# Patient Record
Sex: MALE | Race: OTHER | URBAN - METROPOLITAN AREA
[De-identification: names, ages, dates, MRNs, and addresses within clinical notes are randomized per-mention and may not be internally consistent; named-entity substitution may affect disease eponyms.]

---

## 2020-12-29 ENCOUNTER — OFFICE VISIT (OUTPATIENT)
Dept: FAMILY MEDICINE CLINIC | Facility: CLINIC | Age: 40
End: 2020-12-29
Payer: MEDICAID

## 2020-12-29 VITALS
WEIGHT: 151.63 LBS | HEART RATE: 73 BPM | RESPIRATION RATE: 18 BRPM | OXYGEN SATURATION: 99 % | HEIGHT: 71 IN | BODY MASS INDEX: 21.23 KG/M2 | TEMPERATURE: 98 F | DIASTOLIC BLOOD PRESSURE: 64 MMHG | SYSTOLIC BLOOD PRESSURE: 112 MMHG

## 2020-12-29 DIAGNOSIS — J30.9 ALLERGIC RHINITIS, UNSPECIFIED SEASONALITY, UNSPECIFIED TRIGGER: ICD-10-CM

## 2020-12-29 DIAGNOSIS — Z13.29 SCREENING FOR ENDOCRINE, NUTRITIONAL, METABOLIC AND IMMUNITY DISORDER: ICD-10-CM

## 2020-12-29 DIAGNOSIS — D86.3 SARCOIDOSIS OF SKIN: ICD-10-CM

## 2020-12-29 DIAGNOSIS — Z13.0 SCREENING FOR ENDOCRINE, NUTRITIONAL, METABOLIC AND IMMUNITY DISORDER: ICD-10-CM

## 2020-12-29 DIAGNOSIS — Z13.228 SCREENING FOR ENDOCRINE, NUTRITIONAL, METABOLIC AND IMMUNITY DISORDER: ICD-10-CM

## 2020-12-29 DIAGNOSIS — Z13.0 SCREENING FOR IRON DEFICIENCY ANEMIA: ICD-10-CM

## 2020-12-29 DIAGNOSIS — Z13.21 SCREENING FOR ENDOCRINE, NUTRITIONAL, METABOLIC AND IMMUNITY DISORDER: ICD-10-CM

## 2020-12-29 DIAGNOSIS — Z13.6 SCREENING FOR HEART DISEASE: ICD-10-CM

## 2020-12-29 DIAGNOSIS — Z00.00 ANNUAL PHYSICAL EXAM: Primary | ICD-10-CM

## 2020-12-29 PROBLEM — J31.0 CHRONIC RHINITIS: Status: ACTIVE | Noted: 2020-12-29

## 2020-12-29 PROCEDURE — 85025 COMPLETE CBC W/AUTO DIFF WBC: CPT | Performed by: FAMILY MEDICINE

## 2020-12-29 PROCEDURE — 80053 COMPREHEN METABOLIC PANEL: CPT | Performed by: FAMILY MEDICINE

## 2020-12-29 PROCEDURE — 36415 COLL VENOUS BLD VENIPUNCTURE: CPT | Performed by: FAMILY MEDICINE

## 2020-12-29 PROCEDURE — 99386 PREV VISIT NEW AGE 40-64: CPT | Performed by: FAMILY MEDICINE

## 2020-12-29 PROCEDURE — 3008F BODY MASS INDEX DOCD: CPT | Performed by: FAMILY MEDICINE

## 2020-12-29 PROCEDURE — 3078F DIAST BP <80 MM HG: CPT | Performed by: FAMILY MEDICINE

## 2020-12-29 PROCEDURE — 80061 LIPID PANEL: CPT | Performed by: FAMILY MEDICINE

## 2020-12-29 PROCEDURE — 3074F SYST BP LT 130 MM HG: CPT | Performed by: FAMILY MEDICINE

## 2020-12-29 PROCEDURE — 84443 ASSAY THYROID STIM HORMONE: CPT | Performed by: FAMILY MEDICINE

## 2020-12-29 RX ORDER — CEPHALEXIN 500 MG/1
500 CAPSULE ORAL 4 TIMES DAILY
COMMUNITY
End: 2020-12-29

## 2020-12-29 RX ORDER — FLUTICASONE PROPIONATE 50 MCG
1 SPRAY, SUSPENSION (ML) NASAL DAILY
Qty: 1 BOTTLE | Refills: 3 | Status: SHIPPED | OUTPATIENT
Start: 2020-12-29

## 2020-12-29 NOTE — PROGRESS NOTES
Patient presents with:  Physical: annual physical exam; not fasting       HPI:   Stacie Curling is a 41year old male who presents for a complete physical exam.   Patient is present for complete physical. Feels very well. Up to date with dental visits. No hearin confirms yes.       Wt Readings from Last 3 Encounters:  12/29/20 : 151 lb 9.6 oz (68.8 kg)     BP Readings from Last 3 Encounters:  12/29/20 : 112/64      Cholesterol, Total (mg/dL)   Date Value   12/29/2020 177     HDL Cholesterol (mg/dL)   Date Value   1 Ht 5' 11\" (1.803 m)   Wt 151 lb 9.6 oz (68.8 kg)   SpO2 99%   BMI 21.14 kg/m²   GENERAL: WD/WN in no acute distress. HEENT: PERRLA and EOMI. OP moist no lesions.  TM normal, canals normal.  NECK: is supple,thyroid- normal.  LUNGS: are clear to ausculta Fluticasone nasal spray daily prn    - Fluticasone Propionate 50 MCG/ACT Nasal Suspension; 1 spray by Nasal route daily. Dispense: 1 Bottle; Refill: 3    4.  Screening for endocrine, nutritional, metabolic and immunity disorder    - COMP METABOLIC PANEL (1

## 2020-12-29 NOTE — PROGRESS NOTES
Patient came in for draw of ordered fasting labs. Patient drawn out of R AC, x 1 attempt and tolerated well. 1 lt grn and 1 lav tube drawn.

## 2020-12-30 NOTE — PROGRESS NOTES
Pt notified via [facility]:  - Hgb is a little low, but not enough to start iron supplement. Will rpt CBC in 1 year. - lipids with borderline LDL (diet and lifestyle changes)  - CMP and TSH are normal.    OK to rpt labs in 1 year.

## 2021-03-11 ENCOUNTER — OFFICE VISIT (OUTPATIENT)
Dept: DERMATOLOGY CLINIC | Facility: CLINIC | Age: 41
End: 2021-03-11
Payer: MEDICAID

## 2021-03-11 DIAGNOSIS — D86.3 CUTANEOUS SARCOIDOSIS: Primary | ICD-10-CM

## 2021-03-11 DIAGNOSIS — L81.0 POST-INFLAMMATORY HYPERPIGMENTATION: ICD-10-CM

## 2021-03-11 PROCEDURE — 99203 OFFICE O/P NEW LOW 30 MIN: CPT | Performed by: DERMATOLOGY

## 2021-03-11 NOTE — PROGRESS NOTES
HPI:     Chief Complaint     Lesion        HPI     Lesion      Additional comments: New Patient present with raised flesh color lesion on tip of nose . Patient c/o having lesion for 2 years . Patient c/o lesion change size.  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 They are well-nourished. Exam is remarkable for the following-  1.   Photos show granulomatous disease over lips and nose, which is

## 2021-03-19 ENCOUNTER — TELEPHONE (OUTPATIENT)
Dept: FAMILY MEDICINE CLINIC | Facility: CLINIC | Age: 41
End: 2021-03-19

## 2021-03-19 NOTE — TELEPHONE ENCOUNTER
[facility] coordinator with [facility] called office stating pt had a mobile crisis event on 03/12/2021. They were not hospitalized they were referred out.

## 2021-03-19 NOTE — TELEPHONE ENCOUNTER
Called patient [facility] to return call to the office. Provided pt office phone [phone] along with office hours.

## 2021-03-19 NOTE — TELEPHONE ENCOUNTER
USHA with Vamshi Mcqueen with [employer] [facility] to return call to the office. Provided pt office phone [phone] along with office hours.

## 2022-07-22 ENCOUNTER — OFFICE VISIT (OUTPATIENT)
Dept: FAMILY MEDICINE CLINIC | Facility: CLINIC | Age: 42
End: 2022-07-22
Payer: MEDICAID

## 2022-07-22 VITALS
WEIGHT: 152.63 LBS | DIASTOLIC BLOOD PRESSURE: 70 MMHG | TEMPERATURE: 98 F | BODY MASS INDEX: 21.13 KG/M2 | HEART RATE: 78 BPM | HEIGHT: 71.38 IN | SYSTOLIC BLOOD PRESSURE: 120 MMHG | RESPIRATION RATE: 18 BRPM | OXYGEN SATURATION: 98 %

## 2022-07-22 DIAGNOSIS — Z13.29 SCREENING FOR ENDOCRINE, NUTRITIONAL, METABOLIC AND IMMUNITY DISORDER: ICD-10-CM

## 2022-07-22 DIAGNOSIS — Z13.0 SCREENING FOR ENDOCRINE, NUTRITIONAL, METABOLIC AND IMMUNITY DISORDER: ICD-10-CM

## 2022-07-22 DIAGNOSIS — Z63.0 MARITAL CONFLICT: ICD-10-CM

## 2022-07-22 DIAGNOSIS — Z13.6 SCREENING FOR ISCHEMIC HEART DISEASE: ICD-10-CM

## 2022-07-22 DIAGNOSIS — Z00.00 ANNUAL PHYSICAL EXAM: Primary | ICD-10-CM

## 2022-07-22 DIAGNOSIS — Z13.228 SCREENING FOR ENDOCRINE, NUTRITIONAL, METABOLIC AND IMMUNITY DISORDER: ICD-10-CM

## 2022-07-22 DIAGNOSIS — F10.20 ALCOHOLISM (HCC): ICD-10-CM

## 2022-07-22 DIAGNOSIS — Z13.21 SCREENING FOR ENDOCRINE, NUTRITIONAL, METABOLIC AND IMMUNITY DISORDER: ICD-10-CM

## 2022-07-22 PROCEDURE — 3078F DIAST BP <80 MM HG: CPT | Performed by: FAMILY MEDICINE

## 2022-07-22 PROCEDURE — 3008F BODY MASS INDEX DOCD: CPT | Performed by: FAMILY MEDICINE

## 2022-07-22 PROCEDURE — 3074F SYST BP LT 130 MM HG: CPT | Performed by: FAMILY MEDICINE

## 2022-07-22 PROCEDURE — 99396 PREV VISIT EST AGE 40-64: CPT | Performed by: FAMILY MEDICINE

## 2022-07-22 SDOH — SOCIAL STABILITY - SOCIAL INSECURITY: PROBLEMS IN RELATIONSHIP WITH SPOUSE OR PARTNER: Z63.0

## 2022-07-24 PROBLEM — Z63.0 MARITAL CONFLICT: Status: ACTIVE | Noted: 2022-07-24

## 2022-07-24 PROBLEM — F10.20 ALCOHOLISM (HCC): Status: ACTIVE | Noted: 2022-07-24

## 2023-09-14 ENCOUNTER — APPOINTMENT (OUTPATIENT)
Dept: GENERAL RADIOLOGY | Age: 43
End: 2023-09-14
Attending: PHYSICIAN ASSISTANT
Payer: MEDICAID

## 2023-09-14 ENCOUNTER — HOSPITAL ENCOUNTER (OUTPATIENT)
Age: 43
Discharge: HOME | End: 2023-09-14
Payer: MEDICAID

## 2023-09-14 VITALS
DIASTOLIC BLOOD PRESSURE: 92 MMHG | RESPIRATION RATE: 20 BRPM | SYSTOLIC BLOOD PRESSURE: 135 MMHG | OXYGEN SATURATION: 97 % | BODY MASS INDEX: 22.35 KG/M2 | HEART RATE: 102 BPM | HEIGHT: 72 IN | TEMPERATURE: 98 F | WEIGHT: 165 LBS

## 2023-09-14 DIAGNOSIS — M25.561 ACUTE PAIN OF BOTH KNEES: Primary | ICD-10-CM

## 2023-09-14 DIAGNOSIS — M25.562 PAIN AND SWELLING OF LEFT KNEE: ICD-10-CM

## 2023-09-14 DIAGNOSIS — M25.462 PAIN AND SWELLING OF LEFT KNEE: ICD-10-CM

## 2023-09-14 DIAGNOSIS — M25.562 ACUTE PAIN OF BOTH KNEES: Primary | ICD-10-CM

## 2023-09-14 DIAGNOSIS — S83.91XA SPRAIN OF RIGHT KNEE, UNSPECIFIED LIGAMENT, INITIAL ENCOUNTER: ICD-10-CM

## 2023-09-14 PROCEDURE — 73560 X-RAY EXAM OF KNEE 1 OR 2: CPT | Performed by: PHYSICIAN ASSISTANT

## 2023-09-14 PROCEDURE — 99213 OFFICE O/P EST LOW 20 MIN: CPT | Performed by: PHYSICIAN ASSISTANT

## 2023-09-14 PROCEDURE — A6449 LT COMPRES BAND >=3" <5"/YD: HCPCS | Performed by: PHYSICIAN ASSISTANT

## 2023-09-14 RX ORDER — NAPROXEN 500 MG/1
500 TABLET ORAL 2 TIMES DAILY PRN
Qty: 14 TABLET | Refills: 0 | Status: SHIPPED | OUTPATIENT
Start: 2023-09-14 | End: 2023-09-21

## 2024-04-27 PROCEDURE — 93010 ELECTROCARDIOGRAM REPORT: CPT | Performed by: INTERNAL MEDICINE
